# Patient Record
Sex: FEMALE | Race: BLACK OR AFRICAN AMERICAN | NOT HISPANIC OR LATINO | URBAN - METROPOLITAN AREA
[De-identification: names, ages, dates, MRNs, and addresses within clinical notes are randomized per-mention and may not be internally consistent; named-entity substitution may affect disease eponyms.]

---

## 2024-05-25 ENCOUNTER — EMERGENCY (EMERGENCY)
Facility: HOSPITAL | Age: 69
LOS: 1 days | Discharge: HOME | End: 2024-05-25
Attending: EMERGENCY MEDICINE | Admitting: EMERGENCY MEDICINE
Payer: COMMERCIAL

## 2024-05-25 VITALS
TEMPERATURE: 98 F | SYSTOLIC BLOOD PRESSURE: 175 MMHG | RESPIRATION RATE: 18 BRPM | DIASTOLIC BLOOD PRESSURE: 90 MMHG | HEIGHT: 58 IN | HEART RATE: 96 BPM | OXYGEN SATURATION: 97 % | WEIGHT: 110.01 LBS

## 2024-05-25 PROCEDURE — 99053 MED SERV 10PM-8AM 24 HR FAC: CPT

## 2024-05-25 PROCEDURE — 99285 EMERGENCY DEPT VISIT HI MDM: CPT

## 2024-05-25 RX ORDER — OMEPRAZOLE 10 MG/1
1 CAPSULE, DELAYED RELEASE ORAL
Refills: 0 | DISCHARGE

## 2024-05-25 RX ORDER — SUCRALFATE 1 G
1 TABLET ORAL
Refills: 0 | DISCHARGE

## 2024-05-25 RX ORDER — METOPROLOL TARTRATE 50 MG
1 TABLET ORAL
Refills: 0 | DISCHARGE

## 2024-05-25 RX ORDER — LOSARTAN/HYDROCHLOROTHIAZIDE 100MG-25MG
1 TABLET ORAL
Refills: 0 | DISCHARGE

## 2024-05-26 VITALS
RESPIRATION RATE: 18 BRPM | TEMPERATURE: 98 F | HEART RATE: 73 BPM | SYSTOLIC BLOOD PRESSURE: 149 MMHG | DIASTOLIC BLOOD PRESSURE: 81 MMHG | OXYGEN SATURATION: 96 %

## 2024-05-26 LAB
ALBUMIN SERPL ELPH-MCNC: 2.6 G/DL — LOW (ref 3.3–5)
ALP SERPL-CCNC: 84 U/L — SIGNIFICANT CHANGE UP (ref 40–120)
ALT FLD-CCNC: 18 U/L — SIGNIFICANT CHANGE UP (ref 12–78)
ANION GAP SERPL CALC-SCNC: 5 MMOL/L — SIGNIFICANT CHANGE UP (ref 5–17)
APPEARANCE UR: CLEAR — SIGNIFICANT CHANGE UP
APTT BLD: 33.8 SEC — SIGNIFICANT CHANGE UP (ref 24.5–35.6)
AST SERPL-CCNC: 26 U/L — SIGNIFICANT CHANGE UP (ref 15–37)
BASOPHILS # BLD AUTO: 0.03 K/UL — SIGNIFICANT CHANGE UP (ref 0–0.2)
BASOPHILS NFR BLD AUTO: 0.4 % — SIGNIFICANT CHANGE UP (ref 0–2)
BILIRUB SERPL-MCNC: 0.4 MG/DL — SIGNIFICANT CHANGE UP (ref 0.2–1.2)
BILIRUB UR-MCNC: NEGATIVE — SIGNIFICANT CHANGE UP
BUN SERPL-MCNC: 11 MG/DL — SIGNIFICANT CHANGE UP (ref 7–23)
CALCIUM SERPL-MCNC: 8.7 MG/DL — SIGNIFICANT CHANGE UP (ref 8.5–10.1)
CHLORIDE SERPL-SCNC: 105 MMOL/L — SIGNIFICANT CHANGE UP (ref 96–108)
CO2 SERPL-SCNC: 25 MMOL/L — SIGNIFICANT CHANGE UP (ref 22–31)
COLOR SPEC: YELLOW — SIGNIFICANT CHANGE UP
CREAT SERPL-MCNC: 0.95 MG/DL — SIGNIFICANT CHANGE UP (ref 0.5–1.3)
DIFF PNL FLD: NEGATIVE — SIGNIFICANT CHANGE UP
EGFR: 66 ML/MIN/1.73M2 — SIGNIFICANT CHANGE UP
EOSINOPHIL # BLD AUTO: 0.03 K/UL — SIGNIFICANT CHANGE UP (ref 0–0.5)
EOSINOPHIL NFR BLD AUTO: 0.4 % — SIGNIFICANT CHANGE UP (ref 0–6)
GLUCOSE SERPL-MCNC: 161 MG/DL — HIGH (ref 70–99)
GLUCOSE UR QL: NEGATIVE MG/DL — SIGNIFICANT CHANGE UP
HCT VFR BLD CALC: 28.6 % — LOW (ref 34.5–45)
HGB BLD-MCNC: 9.1 G/DL — LOW (ref 11.5–15.5)
IMM GRANULOCYTES NFR BLD AUTO: 0.5 % — SIGNIFICANT CHANGE UP (ref 0–0.9)
INR BLD: 0.95 RATIO — SIGNIFICANT CHANGE UP (ref 0.85–1.18)
KETONES UR-MCNC: NEGATIVE MG/DL — SIGNIFICANT CHANGE UP
LACTATE SERPL-SCNC: 0.8 MMOL/L — SIGNIFICANT CHANGE UP (ref 0.7–2)
LEUKOCYTE ESTERASE UR-ACNC: ABNORMAL
LYMPHOCYTES # BLD AUTO: 3.19 K/UL — SIGNIFICANT CHANGE UP (ref 1–3.3)
LYMPHOCYTES # BLD AUTO: 40.5 % — SIGNIFICANT CHANGE UP (ref 13–44)
MCHC RBC-ENTMCNC: 23 PG — LOW (ref 27–34)
MCHC RBC-ENTMCNC: 31.8 GM/DL — LOW (ref 32–36)
MCV RBC AUTO: 72.4 FL — LOW (ref 80–100)
MONOCYTES # BLD AUTO: 0.48 K/UL — SIGNIFICANT CHANGE UP (ref 0–0.9)
MONOCYTES NFR BLD AUTO: 6.1 % — SIGNIFICANT CHANGE UP (ref 2–14)
NEUTROPHILS # BLD AUTO: 4.11 K/UL — SIGNIFICANT CHANGE UP (ref 1.8–7.4)
NEUTROPHILS NFR BLD AUTO: 52.1 % — SIGNIFICANT CHANGE UP (ref 43–77)
NITRITE UR-MCNC: NEGATIVE — SIGNIFICANT CHANGE UP
NRBC # BLD: 0 /100 WBCS — SIGNIFICANT CHANGE UP (ref 0–0)
PH UR: 7 — SIGNIFICANT CHANGE UP (ref 5–8)
PLATELET # BLD AUTO: 205 K/UL — SIGNIFICANT CHANGE UP (ref 150–400)
POTASSIUM SERPL-MCNC: 3.9 MMOL/L — SIGNIFICANT CHANGE UP (ref 3.5–5.3)
POTASSIUM SERPL-SCNC: 3.9 MMOL/L — SIGNIFICANT CHANGE UP (ref 3.5–5.3)
PROT SERPL-MCNC: 8 G/DL — SIGNIFICANT CHANGE UP (ref 6–8.3)
PROT UR-MCNC: 30 MG/DL
PROTHROM AB SERPL-ACNC: 11.1 SEC — SIGNIFICANT CHANGE UP (ref 9.5–13)
RAPID RVP RESULT: SIGNIFICANT CHANGE UP
RBC # BLD: 3.95 M/UL — SIGNIFICANT CHANGE UP (ref 3.8–5.2)
RBC # FLD: 16.9 % — HIGH (ref 10.3–14.5)
SARS-COV-2 RNA SPEC QL NAA+PROBE: SIGNIFICANT CHANGE UP
SODIUM SERPL-SCNC: 135 MMOL/L — SIGNIFICANT CHANGE UP (ref 135–145)
SP GR SPEC: 1.01 — SIGNIFICANT CHANGE UP (ref 1–1.03)
TROPONIN I, HIGH SENSITIVITY RESULT: 7.2 NG/L — SIGNIFICANT CHANGE UP
UROBILINOGEN FLD QL: 1 MG/DL — SIGNIFICANT CHANGE UP (ref 0.2–1)
WBC # BLD: 7.88 K/UL — SIGNIFICANT CHANGE UP (ref 3.8–10.5)
WBC # FLD AUTO: 7.88 K/UL — SIGNIFICANT CHANGE UP (ref 3.8–10.5)

## 2024-05-26 PROCEDURE — 96374 THER/PROPH/DIAG INJ IV PUSH: CPT

## 2024-05-26 PROCEDURE — 36415 COLL VENOUS BLD VENIPUNCTURE: CPT

## 2024-05-26 PROCEDURE — 71250 CT THORAX DX C-: CPT | Mod: MC

## 2024-05-26 PROCEDURE — 71045 X-RAY EXAM CHEST 1 VIEW: CPT

## 2024-05-26 PROCEDURE — 71045 X-RAY EXAM CHEST 1 VIEW: CPT | Mod: 26

## 2024-05-26 PROCEDURE — 71250 CT THORAX DX C-: CPT | Mod: 26,MC

## 2024-05-26 PROCEDURE — 93010 ELECTROCARDIOGRAM REPORT: CPT

## 2024-05-26 PROCEDURE — 81001 URINALYSIS AUTO W/SCOPE: CPT

## 2024-05-26 PROCEDURE — 84484 ASSAY OF TROPONIN QUANT: CPT

## 2024-05-26 PROCEDURE — 99285 EMERGENCY DEPT VISIT HI MDM: CPT | Mod: 25

## 2024-05-26 PROCEDURE — 85025 COMPLETE CBC W/AUTO DIFF WBC: CPT

## 2024-05-26 PROCEDURE — 83605 ASSAY OF LACTIC ACID: CPT

## 2024-05-26 PROCEDURE — 0225U NFCT DS DNA&RNA 21 SARSCOV2: CPT

## 2024-05-26 PROCEDURE — 80053 COMPREHEN METABOLIC PANEL: CPT

## 2024-05-26 PROCEDURE — 93005 ELECTROCARDIOGRAM TRACING: CPT

## 2024-05-26 PROCEDURE — 85730 THROMBOPLASTIN TIME PARTIAL: CPT

## 2024-05-26 PROCEDURE — 85610 PROTHROMBIN TIME: CPT

## 2024-05-26 RX ORDER — SODIUM CHLORIDE 9 MG/ML
1550 INJECTION INTRAMUSCULAR; INTRAVENOUS; SUBCUTANEOUS ONCE
Refills: 0 | Status: COMPLETED | OUTPATIENT
Start: 2024-05-26 | End: 2024-05-26

## 2024-05-26 RX ORDER — CEFPODOXIME PROXETIL 100 MG
1 TABLET ORAL
Qty: 14 | Refills: 0
Start: 2024-05-26 | End: 2024-06-01

## 2024-05-26 RX ORDER — AZITHROMYCIN 500 MG/1
500 TABLET, FILM COATED ORAL ONCE
Refills: 0 | Status: COMPLETED | OUTPATIENT
Start: 2024-05-26 | End: 2024-05-26

## 2024-05-26 RX ORDER — CEFTRIAXONE 500 MG/1
1000 INJECTION, POWDER, FOR SOLUTION INTRAMUSCULAR; INTRAVENOUS ONCE
Refills: 0 | Status: COMPLETED | OUTPATIENT
Start: 2024-05-26 | End: 2024-05-26

## 2024-05-26 RX ORDER — AZITHROMYCIN 500 MG/1
1 TABLET, FILM COATED ORAL
Qty: 4 | Refills: 0
Start: 2024-05-26 | End: 2024-05-29

## 2024-05-26 RX ADMIN — SODIUM CHLORIDE 1550 MILLILITER(S): 9 INJECTION INTRAMUSCULAR; INTRAVENOUS; SUBCUTANEOUS at 01:11

## 2024-05-26 RX ADMIN — CEFTRIAXONE 100 MILLIGRAM(S): 500 INJECTION, POWDER, FOR SOLUTION INTRAMUSCULAR; INTRAVENOUS at 05:02

## 2024-05-26 RX ADMIN — AZITHROMYCIN 500 MILLIGRAM(S): 500 TABLET, FILM COATED ORAL at 05:01

## 2024-05-26 NOTE — ED ADULT NURSE NOTE - NS ED NURSE DC INFO COMPLEXITY
Addended by: DAVIDA ROSSI on: 5/31/2019 09:33 AM     Modules accepted: Edwin     Simple: Patient demonstrates quick and easy understanding

## 2024-05-26 NOTE — ED PROVIDER NOTE - NSFOLLOWUPINSTRUCTIONS_ED_ALL_ED_FT
You have been given a copy of your lab results and CT results.  Please follow-up with your primary care doctor for workup of incidental thyroid nodule.  Also follow-up with a gastroenterologist for further workup and evaluation of stomach wall thickening to rule out malignancy versus gastritis.  Complete the antibiotics as prescribed.  Return to the emergency room if you develop difficulty breathing or chest pain or abdominal pain or continue to have fevers for more than 3 to 4 days on the antibiotics.  You will likely need to have a repeat chest x-ray or CT chest done after antibiotic treatment has been completed at the discretion of your primary care doctor or gastroenterologist.    Community-Acquired Pneumonia, Adult  Pneumonia is a lung infection that causes inflammation and the buildup of mucus and fluids in the lungs. This may cause coughing and difficulty breathing. Community-acquired pneumonia is pneumonia that develops in people who are not, and have not recently been, in a hospital or other health care facility.    Usually, pneumonia develops as a result of an illness that is caused by a virus, such as the common cold and the flu (influenza). It can also be caused by bacteria or fungi. While the common cold and influenza can pass from person to person (are contagious), pneumonia itself is not considered contagious.    What are the causes?  The human body, showing how a virus travels from the air to a person's lungs.   This condition may be caused by:  Viruses.  Bacteria.  Fungi.  What increases the risk?  The following factors may make you more likely to develop this condition:  Being over age 65 or having certain medical conditions, such as:  A long-term (chronic) disease, such as: chronic obstructive pulmonary disease (COPD), asthma, heart failure, diabetes, or kidney disease.  A condition that increases the risk of breathing in (aspirating) mucus and other fluids from your mouth and nose.  A weakened body defense system (immune system).  Having had your spleen removed (splenectomy). The spleen is the organ that helps fight germs and infections.  Not cleaning your teeth and gums well (poor dental hygiene).  Using tobacco products.  Traveling to places where germs that cause pneumonia are present or being near certain animals or animal habitats that could have germs that cause pneumonia.  What are the signs or symptoms?  Symptoms of this condition include:  A dry cough or a wet (productive) cough.  A fever, sweating, or chills.  Chest pain, especially when breathing deeply or coughing.  Fast breathing, difficulty breathing, or shortness of breath.  Tiredness (fatigue) and muscle aches.  How is this diagnosed?  An outline of a person's body and an image of an X-ray of the person's chest.   This condition may be diagnosed based on your medical history or a physical exam. You may also have tests, including:  Imaging, such as a chest X-ray or lung ultrasound.  Tests of:  The level of oxygen and other gases in your blood.  Mucus from your lungs (sputum).  Fluid around your lungs (pleural fluid).  Your urine.  How is this treated?  Treatment for this condition depends on many factors, such as the cause of your pneumonia, your medicines, and other medical conditions that you have.    For most adults, pneumonia may be treated at home. In some cases, treatment must happen in a hospital and may include:  Medicines that are given by mouth (orally) or through an IV, including:  Antibiotic medicines, if bacteria caused the pneumonia.  Medicines that kill viruses (antiviral medicines), if a virus caused the pneumonia.  Oxygen therapy.  Severe pneumonia, although rare, may require the following treatments:  Mechanical ventilation.This procedure uses a machine to help you breathe if you cannot breathe well on your own or maintain a safe level of blood oxygen.  Thoracentesis. This procedure removes any buildup of pleural fluid to help with breathing.  Follow these instructions at home:  A comparison of three sample cups showing dark yellow, yellow, and pale yellow urine.  Medicines    Take over-the-counter and prescription medicines only as told by your health care provider.  Take cough medicine only if you have trouble sleeping. Cough medicine can prevent your body from removing mucus from your lungs.  If you were prescribed antibiotics, take them as told by your health care provider. Do not stop taking the antibiotic even if you start to feel better.  Lifestyle    A sign showing that a person should not drink alcohol.  A sign showing that a person should not smoke.  Do not drink alcohol.  Do not use any products that contain nicotine or tobacco. These products include cigarettes, chewing tobacco, and vaping devices, such as e-cigarettes. If you need help quitting, ask your health care provider.  Eat a healthy diet. This includes plenty of vegetables, fruits, whole grains, low-fat dairy products, and lean protein.  General instructions    Rest a lot and get at least 8 hours of sleep each night.  Sleep in a partly upright position at night. Place a few pillows under your head or sleep in a reclining chair.  Return to your normal activities as told by your health care provider. Ask your health care provider what activities are safe for you.  Drink enough fluid to keep your urine pale yellow. This helps to thin the mucus in your lungs.  If your throat is sore, gargle with a mixture of salt and water 3–4 times a day or as needed. To make salt water, completely dissolve ½–1 tsp (3–6 g) of salt in 1 cup (237 mL) of warm water.  Keep all follow-up visits.  How is this prevented?  You can lower your risk of developing community-acquired pneumonia by:  Getting the pneumonia vaccine. There are different types and schedules of pneumonia vaccines. Ask your health care provider which option is best for you. Consider getting the pneumonia vaccine if:  You are older than 65 years of age.  You are 19–65 years of age and are receiving cancer treatment, have chronic lung disease, or have other medical conditions that affect your immune system. Ask your health care provider if this applies to you.  Getting your influenza vaccine every year. Ask your health care provider which type of vaccine is best for you.  Getting regular dental checkups.  Washing your hands often with soap and water for at least 20 seconds. If soap and water are not available, use hand .  Contact a health care provider if:  You have a fever.  You have trouble sleeping because you cannot control your cough with cough medicine.  Get help right away if:  Your shortness of breath becomes worse.  Your chest pain increases.  Your sickness becomes worse, especially if you are an older adult or have a weak immune system.  You cough up blood.  These symptoms may be an emergency. Get help right away. Call 911.  Do not wait to see if the symptoms will go away.  Do not drive yourself to the hospital.  Summary  Pneumonia is an infection of the lungs.  Community-acquired pneumonia develops in people who have not been in the hospital. It can be caused by bacteria, viruses, or fungi.  This condition may be treated with antibiotics or antiviral medicines.  Severe pneumonia may require a hospital stay and treatment to help with breathing.  This information is not intended to replace advice given to you by your health care provider. Make sure you discuss any questions you have with your health care provider.

## 2024-05-26 NOTE — ED ADULT NURSE NOTE - OBJECTIVE STATEMENT
67 yo female accompanied by spouse presents to ED c/o of intermittent fever x 2 weeks and productive cough x 6 weeks. 69 yo female accompanied by spouse presents to ED c/o of intermittent fever x 2 weeks and productive cough x 6 weeks. Denies chills, dizziness, nausea, vomiting, and diarrhea.

## 2024-05-26 NOTE — ED PROVIDER NOTE - CLINICAL SUMMARY MEDICAL DECISION MAKING FREE TEXT BOX
68-year-old female with fever for over 2 weeks.  No antipyretics today.  Patient is afebrile in the ED.  Will evaluate etiology of fever.  Will check labs, urine, chest x-ray.  Will reassess.

## 2024-05-26 NOTE — ED PROVIDER NOTE - CARE PROVIDER_API CALL
Aidan Braden  Gastroenterology  [address] [place] [id number]  Phone: [phone]  Fax: [phone]  Follow Up Time: Routine

## 2024-05-26 NOTE — ED PROVIDER NOTE - PROGRESS NOTE DETAILS
Labs reviewed, noted with UTI.  CT chest shows multifocal pneumonia.  Patient also just completed a 10-day course of Levaquin by her PCP.  These could be residual findings that have not resolved yet on the CT versus active infection.  Fever could also be from the UTI.  Will treat for both with ceftriaxone and azithromycin given 1 dose in the ED and DC home on cefpodoxime and azithromycin to complete.  Patient is in no acute distress and saturating 100% on room air.  Rest of labs unremarkable.  CT report was discussed in length with patient.  She was given a copy of the reports and important sections are also highlighted and was advised to follow-up with GI for routine EGD and colonoscopy and to review the CT findings of gastric wall thickening.  Patient also states she is never had a colonoscopy or EGD.  Patient also informed of prominent lymph nodes.  Also noted was a thyroid nodule.  Advise follow-up with her PCP for dedicated thyroid ultrasound and TFTs.  Patient given the opportunity to ask questions or concerns were addressed.  Return precautions were discussed.

## 2024-05-26 NOTE — ED PROVIDER NOTE - OBJECTIVE STATEMENT
68-year-old female with history of hypertension, GERD is presenting with intermittent fever for the past 2 weeks.  Patient reports she went to see her primary care doctor who said that she had chest congestion and gave her Levaquin 500 milligrams for 10 days which she finished about a week ago.  Patient reports she did not feel any improvement in her cough or her fever.  States her last temperature was 100-101 taken orally earlier this evening.  Patient reports she has not had any Tylenol or ibuprofen today at all.  States she has chest congestion and cough but states the cough comes from the back of her throat as if she has she has a tickling feeling in her throat but does not have any chest pain or shortness of breath.  Denies abdominal pain diarrhea.  Denies urinary symptoms.  Non-smoker.  No history of asthma.  No recent travel.  No unusual rash.

## 2024-05-26 NOTE — ED PROVIDER NOTE - PATIENT PORTAL LINK FT
You can access the FollowMyHealth Patient Portal offered by [facility] by registering at the following website: [email or web address]. By joining [facility]’s FollowMyHealth portal, you will also be able to view your health information using other applications (apps) compatible with our system.

## 2024-05-26 NOTE — ED ADULT NURSE NOTE - IS THE PATIENT ABLE TO BE SCREENED?
Patient: Kelli Camejo    Procedure Summary     Date:  12/28/19 Room / Location:      Anesthesia Start:  0633 Anesthesia Stop:      Procedure:  LABOR ANALGESIA Diagnosis:      Scheduled Providers:   Provider:  Yoko Ace DO    Anesthesia Type:  epidural ASA Status:  2          Anesthesia Type: epidural    Vitals  Vitals Value Taken Time   /66 12/29/2019  7:00 AM   Temp 98.5 °F (36.9 °C) 12/29/2019  7:00 AM   Pulse 106 12/29/2019  7:00 AM   Resp 16 12/29/2019  7:00 AM   SpO2             Post Anesthesia Care and Evaluation    Patient location during evaluation: bedside  Patient participation: complete - patient participated  Level of consciousness: awake and awake and alert  Pain score: 0  Pain management: satisfactory to patient  Airway patency: patent  Anesthetic complications: No anesthetic complications  PONV Status: none  Cardiovascular status: acceptable  Respiratory status: acceptable  Hydration status: acceptable  Post Neuraxial Block status: Motor and sensory function returned to baseline and No signs or symptoms of PDPH    
Yes

## 2025-04-11 NOTE — ED ADULT TRIAGE NOTE - CHIEF COMPLAINT QUOTE
Referral has been faxed to Pulm hypertension specialist in [place].    Intermittent fever x 2.5 weeks, completed abx x 1 week ago, came back. Endorses cough x 6 weeks.